# Patient Record
(demographics unavailable — no encounter records)

---

## 2025-01-22 NOTE — ECG
[facility]

                                       

Test Date:    2025               Test Time:    15:01:18

Pat Name:     YVONNE AU                Department:   ED

Patient ID:   [id number]           Room:         [address] [place] [address]

Gender:       M                        Technician:   MYRA

:          1963               Requested By: AMBER CHAUHAN

Order Number: [id number]        Reading MD:   Arslan Mike

                                 Measurements

Intervals                              Axis          

Rate:         59                       P:            17

KY:           158                      QRS:          31

QRSD:         103                      T:            57

QT:           429                                    

QTc:          425                                    

                           Interpretive Statements

Sinus rhythm

Minimal ST elevation, inferior leads

Electronically Signed On 2025 17:06:57 PST by Arslan Mike



Please click the below link to view image of tracing.

## 2025-01-22 NOTE — DVH
XY CHEST PORTABLE, 



HISTORY: Chest pain



COMPARISON: XY CHEST XRAY 1 VIEW on DOS: 10/22/23, CHEST PORTABLE on DOS: 9/8/19



XY CHEST XRAY 1 VIEW on DOS: 10/22/23, CHEST PORTABLE on DOS: 9/8/19



TECHNICAL DATA:  1 view of the chest was obtained.



FINDINGS: 



Lines and tubes: None



Cardiomediastinal silhouette: normal



Pulmonary vasculature: normal



Lung expansion: normal



Lung airspace: normal



Lung interstitium:  normal



Pleura: normal



Pneumothorax: no



Bones: Unremarkable



Other: no



IMPRESSION: 



No acute intrathoracic abnormality.



Electronically Signed by: Kyaw Olguin at 01/22/2025 16:01:34 PM

## 2025-01-22 NOTE — DVHHP2
History of Present Illness


Reason for Visit:  Chest pain


History of Present Illness


61-year-old male presents for evaluation chest pain.  Patient endorses a one 

year history of intermittent left-sided pressure-like chest pain.  She reports 

that over the past four days symptoms have become more constant.  Reports the 

pain at 7/10 intensity.  Shortness of breath.  No nausea or vomiting.


Past Medical History


Hypertension and kidney stones


Past Surgical History


Appendectomy


Family History


Diabetes mellitus


Smoke:  <1 pack per day


ALCOHOL:  none


Drugs:  None


Lives:  with Family





Review of Systems


Review of Systems


Review of systems are currently addressed in HPI.


Allergies:  


Coded Allergies:  


     NO KNOWN ALLERGIES (Unverified , 4/13/21)


Medications





                               Current Medications








 Medications  Dose


 Ordered  Sig/Michelle


 Route  Start Time


 Stop Time Status Last Admin


Dose Admin


 


 Atorvastatin


 Calcium  80 mg  HS


 PO  1/22/25 22:00


     





 


 Morphine Sulfate  1 mg  Q4HP  PRN


 IV  1/22/25 14:30


     





 


 Aspirin  81 mg  DAILY


 PO  1/23/25 10:00


     





 


 Enoxaparin Sodium  40 mg  DAILY


 SC  1/23/25 10:00


   Future Hold  





 


 Temazepam  15 mg  QHSP  PRN


 PO  1/22/25 19:00


     





 


 Ondansetron HCl  4 mg  Q4HP  PRN


 IV  1/22/25 19:00


     














Exam


Vital Signs





Vital Signs








  Date Time  Temp Pulse Resp B/P (MAP) Pulse Ox O2 Delivery O2 Flow Rate FiO2


 


1/22/25 15:01  59      


 


1/22/25 14:50      Room Air* 0 21


 


1/22/25 14:50 98.0  17 113/76 (88) 98   





 98.0       








Exam


Gen:  61-year-old in mild distress.


Skin: Warm, dry, normal color and texture, no rash.


HEENT: Normocephalic atraumatic, mucous membranes moist and pink.


Neck: Cervical and supraclavicular nodes normal without enlargement, trachea is 

midline, thyroid gland is normal without masses.


Pulmonary: Clear to auscultation and percussion bilaterally.


Cardiac: Regular rate and rhythm.  No murmur


Abdomen: Soft, nontender, nondistended, bowel sounds present all 4 quadrants, no

guarding, no rigidity, no organomegaly.


Extremities: No cyanosis, clubbing, no edema


Neuro: Cranial nerves II through XII grossly intact, normal affect and speech, 

no focal motor deficits.





Labs/Xrays





ORDERING PHYSICIAN: AMBER CHAUHAN RESIDENT


PROCEDURE(s): CXRP - CHEST PORTABLE


REASON: Chest pain


ORDER NUMBER(s): [id number], ACCESSION NUMBER(s): [id number]








XY CHEST PORTABLE, 





HISTORY: Chest pain





COMPARISON: XY CHEST XRAY 1 VIEW on DOS: 10/22/23, CHEST PORTABLE on DOS: 9/8/19





XY CHEST XRAY 1 VIEW on DOS: 10/22/23, CHEST PORTABLE on DOS: 9/8/19





TECHNICAL DATA:  1 view of the chest was obtained.





FINDINGS: 





Lines and tubes: None





Cardiomediastinal silhouette: normal





Pulmonary vasculature: normal





Lung expansion: normal





Lung airspace: normal





Lung interstitium:  normal





Pleura: normal





Pneumothorax: no





Bones: Unremarkable





Other: no





IMPRESSION: 





No acute intrathoracic abnormality.





Electronically Signed by: Kyaw Olguin at 01/22/2025 16:01:34 PM











                                      Labs








Test


 1/22/25


15:53 1/22/25


14:44 Range/Units


 


 


Troponin I High Sensitivity 3 L  </=54  ng/L


 


White Blood Count


 


 6.6 


 4.4-10.8


10^3/uL


 


Red Blood Count


 


 5.65 


 4.5-5.90


10^6/uL


 


Hemoglobin  17.1  13.5-17.5  g/dL


 


Hematocrit  51.1  41.0-53.0  %


 


Mean Corpuscular Volume  90.5  80.0-100.0  fL


 


Mean Corpuscular Hemoglobin  30.2  28.0-32.0  pg


 


Mean Corpuscular Hemoglobin


Concent 


 33.4 


 32.0-36.0  g/dL





 


Red Cell Distribution Width  13.6  11.8-14.3  %


 


Platelet Count


 


 215 


 140-450


10^3/uL


 


Mean Platelet Volume  8.0  6.9-10.8  fL


 


Neutrophils (%) (Auto)  57.9  37.0-80.0  %


 


Lymphocytes (%) (Auto)  31.1  10.0-50.0  %


 


Monocytes (%) (Auto)  7.6  0.0-12.0  %


 


Eosinophils (%) (Auto)  2.7  0.0-7.0  %


 


Basophils (%) (Auto)  0.7  0.0-2.0  %


 


Neutrophils # (Auto)


 


 3.8 


 1.6-8.6  10


^3/uL


 


Lymphocytes # (Auto)


 


 2.1 


 0.4-5.4  10


^3/uL


 


Monocytes # (Auto)  0.5  0-1.3  10 ^3/uL


 


Eosinophils # (Auto)  0.2  0-0.8  10 ^3/uL


 


Basophils # (Auto)  0  0-0.2  10 ^3/uL


 


Nucleated Red Blood Cells  0.1   %


 


Prothrombin Time  10.6  9.3-11.8  sec


 


Prothrombin Time INR  1.00  0.9-1.15  


 


Activated Partial


Thromboplast Time 


 29.2 


 24.5-34.5  SEC





 


Sodium Level  137  136-145  mmol/L


 


Potassium Level  4.4  3.5-5.1  mmol/L


 


Chloride Level  105    mmol/L


 


Carbon Dioxide Level  24  20-31  mmol/L


 


Anion Gap  8  5-15  


 


Blood Urea Nitrogen  21  9-23  mg/dL


 


Creatinine


 


 1.17 


 0.700-1.30


mg/dL


 


Glomerular Filtration Rate


Calc 


 71 


 >90  mL/min





 


BUN/Creatinine Ratio  17.9  10.0-20.0  


 


Serum Glucose  93    mg/dL


 


Lactic Acid Level  0.9  0.4-2.0  mmol/L


 


Calcium Level  10.6 H 8.7-10.4  mg/dL


 


Magnesium Level  2.0  1.6-2.6  mg/dL


 


Total Bilirubin  0.5  0.2-1.0  mg/dL


 


Aspartate Amino Transferase


(AST) 


 17 


 13-40  U/L





 


Alanine Aminotransferase (ALT)  26  7-40  U/L


 


Alkaline Phosphatase  94    U/L


 


B-Type Natriuretic Peptide  10.82  0-100  pg/mL


 


Total Protein  7.5  5.7-8.2  g/dL


 


Albumin  5.1 H 3.2-4.8  g/dL


 


Thyroid Stimulating Hormone


(TSH) 


 1.15 


 0.55-4.78


uIU/mL











Assessment/Plan


Assessment/Plan


Assessment 


chest pain out ACS 


Hypertension





Plan 


Admit the patient to [facility] to the hospitalist


ACS protocol 


Cardiology consultation 


NPO


Continue treatment per orders


Plan discussed with:  Patient


My Orders





                          Orders - RITA RAMIREZ








Procedure Category Date Status





  Time 


 


Basic Metabolic Panel LAB 1/23/25 Verified





  04:00 


 


Admit ADMIT 1/22/25 Transmitted





  18:57 


 


Temazepam (Restoril) PHA 1/22/25 In Process





  19:00 


 


Ondansetron Hcl PHA 1/22/25 In Process





 (Zofran)  19:00 


 


Echo 2d Mode Cardiac US 1/22/25 Logged





DOP  18:57 


 


Condition: Stable BONIFACIO 1/22/25 In Process





  18:57 


 


Bedrest With Bathroom BONIFACIO 1/22/25 In Process





Privileg  18:57 











Date of Service:  Jan 22, 2025


Billing Provider:  RITA RAMIREZ


Common Visit Codes:  99223-INITIAL  INP/OBS CARE (HIGH)











RITA RAMIREZ           Jan 22, 2025 20:08

## 2025-01-22 NOTE — ED.PDOC
HPI Comments


Jose Pedraza is a 61-year-old male patient who presents to ED with chief 

complaint of left-sided retrosternal pressure/burning chest pain.  Per patient 

this pain has been intermittent for the past year, which is triggered while 

relaxing, and alleviated with physical activity, completed a stress test one 

year ago which was negative, planning to do a coronary angiography before 

prostate intervention.  Chest pain has been increasing in intensity and 

frequency for the past four days, highest intensity was 8/10 two days ago 

between 6-7 p.m, currently patient is intensity is 1/10.  Denies palpitation, 

syncope, dyspnea, nausea, vomiting, diarrhea, sick contacts, recent travel, 

bleeding and motor or sensory deficits.  





Past medical history:  Dyslipidemia, multiple episodes of UTI due to BPH 

complicated with C diff approximately two months ago, eventually planning 

prostatectomy with previous cardiological clearance (Dr. Nolasco was planning on 

completing coronary angiography before procedure), stress test completed one 

year ago which was negative.  


Surgical history:  Appendectomy 


Family history:  Brother has prostate cancer 


Social history:  Lives in [facility] with brother (special needs patient).  

Current smoker (both tobacco and marijuana, approximately 20 pack-year history 

of smoking), occasional alcohol consumption (last drink approximately one month 

ago).  Denies current alcohol and drug abuse.  


Allergies:  Denies 


Home medication:  Flomax, pravastatin, tadalafil (he has been taking tadalafil 

for approximately eight months, pain present before taking medication)


Chief Complaint:  Chest Pain


Time Seen by MD:  14:03


Primary Care Provider:  FLOR


Allergies:  


Coded Allergies:  


     NO KNOWN ALLERGIES (Unverified , 4/13/21)


Home Meds


Active Scripts


Yeast (S. Boulardii)(S. Cerevi (Florastor) 250 Mg Cap, 250 MG PO BID for 10 

Days, #20 CAP


   Prov:ROCÍO MCMULLEN RESIDENT         8/21/24


Vancomycin HCl (Vancomycin HCl) 125 Mg Cap, 125 MG PO QID for 10 Days, #40 CAP


   Prov:ROCÍO MCMULLEN RESIDENT         8/21/24


Acetaminophen (Acetaminophen) 500 Mg Tab, 500 MG PO QID for 10 Days, #40 TAB 0 

Refills


   Prov:ABIMAEL LOPEZ NP         8/12/24


Reported Medications


Tamsulosin Hcl (Flomax) 0.4 Mg Cap, 2 CAP PO DAILY for 90 Days, #180


   8/20/24


Pravastatin Sodium (PRAVACHOL TABLET) 20 Mg Tb, 1 TAB PO DAILY, #30 TAB 5 

Refills


   8/20/24


Mode of Arrival:  Ambulatory





Past Medical History


PAST MEDICAL HISTORY:  HTN, Kidney Stones


Surgical History:  Appendectomy





Family History


Family History:  Family hx of DM





Social History


Smoker:  Cigarettes


Alcohol:  Denies ETOH Use


Drugs:  Denies Drug Use


Lives In:  Home





Physical Exam


General Appearance:  No Apparent Distress, Normal


HEENT:  Normal ENT Inspection, Pharynx Normal, TMs Normal


Neck:  Full Range of Motion, Non-Tender, Normal, Normal Inspection


Respiratory:  Chest Non-Tender, Lungs Clear, No Accessory Muscle Use, No 

Respiratory Distress, Normal Breath Sounds


Cardiovascular:  No Edema, No JVD, No Murmur, No Gallop, Normal Peripheral 

Pulses, Regular Rate/Rhythm


Breast Exam:  Deferred


Gastrointestinal:  No Organomegaly, Non Tender, No Pulsatile Mass, Normal Bowel 

Sounds, Soft


Genitalia:  Deferred


Pelvic:  Deferred


Rectal:  Deferred


Extremities:  No calf tenderness, Normal capillary refill, Normal inspection, 

Normal range of motion, Non-tender, No pedal edema


Neurologic:  Alert, CNs II-XII nml as Tested, No Motor Deficits, Normal Affect, 

Normal Mood, No Sensory Deficits


Cerebellar Function:  Normal


Reflexes:  Normal


Skin:  Dry, Normal Color, Warm


Lymphatic:  No Adenopathy





EKG


EKG :  


Comments


Sinus rhythm with no significant ST alteration.  We will repeat in 1 hour





Was a procedure done?


Was a procedure done?:  No





CP Differential Dx


Differential Diagnosis:  Electrolyte Disorder, Heart Failure, MI, Pulmonary 

Embolus





X-Ray, Labs, Meds, VS





                                   Vital Signs








  Date Time  Temp Pulse Resp B/P (MAP) Pulse Ox O2 Delivery O2 Flow Rate FiO2


 


1/22/25 15:01  59      


 


1/22/25 14:50      Room Air* 0 21


 


1/22/25 14:50 98.0 63 17 113/76 (88) 98   





 98.0       


 


1/22/25 14:05  63      


 


1/22/25 14:00 98.3 65 18 123/91 (102) 97   








                                       Lab








Test


 1/22/25


15:53 1/22/25


14:44 Range/Units


 


 


Troponin I High Sensitivity 3 L < 3 L </=54  ng/L


 


White Blood Count


 


 6.6 


 4.4-10.8


10^3/uL


 


Red Blood Count


 


 5.65 


 4.5-5.90


10^6/uL


 


Hemoglobin  17.1  13.5-17.5  g/dL


 


Hematocrit  51.1  41.0-53.0  %


 


Mean Corpuscular Volume  90.5  80.0-100.0  fL


 


Mean Corpuscular Hemoglobin  30.2  28.0-32.0  pg


 


Mean Corpuscular Hemoglobin


Concent 


 33.4 


 32.0-36.0  g/dL





 


Red Cell Distribution Width  13.6  11.8-14.3  %


 


Platelet Count


 


 215 


 140-450


10^3/uL


 


Mean Platelet Volume  8.0  6.9-10.8  fL


 


Neutrophils (%) (Auto)  57.9  37.0-80.0  %


 


Lymphocytes (%) (Auto)  31.1  10.0-50.0  %


 


Monocytes (%) (Auto)  7.6  0.0-12.0  %


 


Eosinophils (%) (Auto)  2.7  0.0-7.0  %


 


Basophils (%) (Auto)  0.7  0.0-2.0  %


 


Neutrophils # (Auto)


 


 3.8 


 1.6-8.6  10


^3/uL


 


Lymphocytes # (Auto)


 


 2.1 


 0.4-5.4  10


^3/uL


 


Monocytes # (Auto)  0.5  0-1.3  10 ^3/uL


 


Eosinophils # (Auto)  0.2  0-0.8  10 ^3/uL


 


Basophils # (Auto)  0  0-0.2  10 ^3/uL


 


Nucleated Red Blood Cells  0.1   %


 


Prothrombin Time  10.6  9.3-11.8  sec


 


Prothrombin Time INR  1.00  0.9-1.15  


 


Activated Partial


Thromboplast Time 


 29.2 


 24.5-34.5  SEC





 


Sodium Level  137  136-145  mmol/L


 


Potassium Level  4.4  3.5-5.1  mmol/L


 


Chloride Level  105    mmol/L


 


Carbon Dioxide Level  24  20-31  mmol/L


 


Anion Gap  8  5-15  


 


Blood Urea Nitrogen  21  9-23  mg/dL


 


Creatinine


 


 1.17 


 0.700-1.30


mg/dL


 


Glomerular Filtration Rate


Calc 


 71 


 >90  mL/min





 


BUN/Creatinine Ratio  17.9  10.0-20.0  


 


Serum Glucose  93    mg/dL


 


Lactic Acid Level  0.9  0.4-2.0  mmol/L


 


Calcium Level  10.6 H 8.7-10.4  mg/dL


 


Magnesium Level  2.0  1.6-2.6  mg/dL


 


Total Bilirubin  0.5  0.2-1.0  mg/dL


 


Aspartate Amino Transferase


(AST) 


 17 


 13-40  U/L





 


Alanine Aminotransferase (ALT)  26  7-40  U/L


 


Alkaline Phosphatase  94    U/L


 


B-Type Natriuretic Peptide  10.82  0-100  pg/mL


 


Total Protein  7.5  5.7-8.2  g/dL


 


Albumin  5.1 H 3.2-4.8  g/dL


 


Thyroid Stimulating Hormone


(TSH) 


 1.15 


 0.55-4.78


uIU/mL








                               Current Medications








 Medications


  (Trade)  Dose


 Ordered  Sig/Michelle


 Route  Start Time


 Stop Time Status Last Admin


 


 Aspirin  162 mg  ONCE  ONCE


 PO  1/22/25 14:30


 1/22/25 14:31 DC 1/22/25 14:45











X-Ray, Labs, Meds, VS Comment


Reviewed chest x-ray, laboratory workup (troponin less than three) and vital 

signs.


Time of 1ST Reevaluation:  16:13


Reevaluation 1ST:  Unchanged


Patient Education/Counseling:  Diagnosis, Treatment, Prognosis


Family Education/Counseling:  Diagnosis, Treatment, Prognosis





Departure 1


Departure


Time of Disposition:  16:14


Impression:  


   Primary Impression:  


   Chest pain


Disposition:  09 ADMITTED AS INPATIENT


Admit to:  Tele


Condition:  Stable





Additional Instructions:  


Reviewed vital signs, laboratory results and chest x-ray.  Pending urine 

studies.  Patient was planning to get a left heart catheterization as 

outpatient, we will admit patient incomplete left heart catheterization on 01 /23/2025 at 4:00 p.m. patient needed left heart catheterization before 

prostatectomy.





Critical Care Note


Critical Care Time?:  No





Stability


Stability form required:  No





Heart Score


Heart Score:  








Heart Score Response (Comments) Value


 


History Slightly Suspicious 0


 


EKG Repolarization Disturb 1


 


Age                                     45-64 1


 


Risk Factors                            1 or 2 risk factors 1


 


Troponin Normal limit 0


 


Total  3

















AMBER CHAUHAN RESIDENT     Jan 22, 2025 14:41

## 2025-01-24 NOTE — ECG
[facility]

                                       

Test Date:    2025               Test Time:    14:05:35

Pat Name:     YVONNE AU                Department:   ER

Patient ID:   [id number]           Room:         [address] [place] [address] A

Gender:       M                        Technician:   

:          1963               Requested By: AMBER CHAUHAN

Order Number: [id number]        Reading MD:   Arslan Mike

                                 Measurements

Intervals                              Axis          

Rate:         63                       P:            16

VA:           153                      QRS:          -8

QRSD:         104                      T:            50

QT:           413                                    

QTc:          423                                    

                           Interpretive Statements

Sinus rhythm

Minimal ST elevation, inferior leads

Electronically Signed On 2025 17:06:51 PST by Arslan Mike



Please click the below link to view image of tracing.